# Patient Record
Sex: MALE | Race: WHITE | NOT HISPANIC OR LATINO
[De-identification: names, ages, dates, MRNs, and addresses within clinical notes are randomized per-mention and may not be internally consistent; named-entity substitution may affect disease eponyms.]

---

## 2021-09-20 PROBLEM — Z00.00 ENCOUNTER FOR PREVENTIVE HEALTH EXAMINATION: Status: ACTIVE | Noted: 2021-09-20

## 2021-09-23 ENCOUNTER — APPOINTMENT (OUTPATIENT)
Dept: OTOLARYNGOLOGY | Facility: CLINIC | Age: 86
End: 2021-09-23
Payer: MEDICARE

## 2021-09-23 ENCOUNTER — TRANSCRIPTION ENCOUNTER (OUTPATIENT)
Age: 86
End: 2021-09-23

## 2021-09-23 PROCEDURE — 31579 LARYNGOSCOPY TELESCOPIC: CPT

## 2021-09-23 PROCEDURE — 92524 BEHAVRAL QUALIT ANALYS VOICE: CPT | Mod: GN

## 2021-09-29 ENCOUNTER — APPOINTMENT (OUTPATIENT)
Dept: OTOLARYNGOLOGY | Facility: CLINIC | Age: 86
End: 2021-09-29
Payer: MEDICARE

## 2021-09-29 PROCEDURE — 92507 TX SP LANG VOICE COMM INDIV: CPT | Mod: GN

## 2021-10-06 ENCOUNTER — APPOINTMENT (OUTPATIENT)
Dept: OTOLARYNGOLOGY | Facility: CLINIC | Age: 86
End: 2021-10-06
Payer: MEDICARE

## 2021-10-06 PROCEDURE — 92507 TX SP LANG VOICE COMM INDIV: CPT | Mod: GN

## 2021-10-13 ENCOUNTER — APPOINTMENT (OUTPATIENT)
Dept: OTOLARYNGOLOGY | Facility: CLINIC | Age: 86
End: 2021-10-13
Payer: MEDICARE

## 2021-10-13 PROCEDURE — 92507 TX SP LANG VOICE COMM INDIV: CPT | Mod: GN

## 2021-10-20 ENCOUNTER — APPOINTMENT (OUTPATIENT)
Dept: OTOLARYNGOLOGY | Facility: CLINIC | Age: 86
End: 2021-10-20
Payer: MEDICARE

## 2021-10-20 PROCEDURE — 92507 TX SP LANG VOICE COMM INDIV: CPT | Mod: GN

## 2021-10-27 ENCOUNTER — APPOINTMENT (OUTPATIENT)
Dept: OTOLARYNGOLOGY | Facility: CLINIC | Age: 86
End: 2021-10-27
Payer: MEDICARE

## 2021-10-27 PROCEDURE — 92507 TX SP LANG VOICE COMM INDIV: CPT | Mod: GN

## 2021-11-03 ENCOUNTER — APPOINTMENT (OUTPATIENT)
Dept: OTOLARYNGOLOGY | Facility: CLINIC | Age: 86
End: 2021-11-03
Payer: MEDICARE

## 2021-11-03 PROCEDURE — 92507 TX SP LANG VOICE COMM INDIV: CPT | Mod: GN

## 2023-07-14 ENCOUNTER — EMERGENCY (EMERGENCY)
Facility: HOSPITAL | Age: 88
LOS: 1 days | Discharge: HOME | End: 2023-07-14
Admitting: EMERGENCY MEDICINE
Payer: MEDICARE

## 2023-07-14 VITALS
RESPIRATION RATE: 18 BRPM | TEMPERATURE: 98 F | SYSTOLIC BLOOD PRESSURE: 136 MMHG | OXYGEN SATURATION: 97 % | DIASTOLIC BLOOD PRESSURE: 79 MMHG | HEART RATE: 80 BPM

## 2023-07-14 VITALS
OXYGEN SATURATION: 95 % | DIASTOLIC BLOOD PRESSURE: 71 MMHG | RESPIRATION RATE: 17 BRPM | HEART RATE: 69 BPM | TEMPERATURE: 98 F | SYSTOLIC BLOOD PRESSURE: 142 MMHG | WEIGHT: 173.94 LBS

## 2023-07-14 DIAGNOSIS — M25.421 EFFUSION, RIGHT ELBOW: ICD-10-CM

## 2023-07-14 DIAGNOSIS — M25.521 PAIN IN RIGHT ELBOW: ICD-10-CM

## 2023-07-14 DIAGNOSIS — E78.5 HYPERLIPIDEMIA, UNSPECIFIED: ICD-10-CM

## 2023-07-14 DIAGNOSIS — I48.91 UNSPECIFIED ATRIAL FIBRILLATION: ICD-10-CM

## 2023-07-14 DIAGNOSIS — I10 ESSENTIAL (PRIMARY) HYPERTENSION: ICD-10-CM

## 2023-07-14 DIAGNOSIS — Z79.01 LONG TERM (CURRENT) USE OF ANTICOAGULANTS: ICD-10-CM

## 2023-07-14 PROCEDURE — 73080 X-RAY EXAM OF ELBOW: CPT

## 2023-07-14 PROCEDURE — 99284 EMERGENCY DEPT VISIT MOD MDM: CPT

## 2023-07-14 PROCEDURE — 73080 X-RAY EXAM OF ELBOW: CPT | Mod: 26,RT

## 2023-07-14 PROCEDURE — 99283 EMERGENCY DEPT VISIT LOW MDM: CPT | Mod: 25

## 2023-07-14 RX ORDER — CEPHALEXIN 500 MG
1 CAPSULE ORAL
Qty: 40 | Refills: 0
Start: 2023-07-14 | End: 2023-07-23

## 2023-07-14 NOTE — ED PROVIDER NOTE - OBJECTIVE STATEMENT
88 y/o m hx afib on eliquis presents c/o right elbow pain and mild swelling for the past few days.  Pt stating the area has felt warm to him, has no trouble ranging it, hadn't need to take anything for pain.  Denies fever, chills, numbness/tingling to ext, fall/trauma, all other ROS negative.

## 2023-07-14 NOTE — ED PROVIDER NOTE - CLINICAL SUMMARY MEDICAL DECISION MAKING FREE TEXT BOX
86 y/o m presents c/o right elbow pain, swelling to posterior aspect x several days; ext NVI, xr negative, has swelling posteriorly which is likely bursitis.  Pt with mild overlying erythema, will rx keflex, not concerned for septic arthritis.  Will ace wrap, tylenol, keflex, f/u ortho, return to ED if sx worsen.

## 2023-07-14 NOTE — ED PROVIDER NOTE - PROVIDER TOKENS
PROVIDER:[TOKEN:[[id number]]] PROVIDER:[TOKEN:[[id number]]] PROVIDER:[TOKEN:[[id number]]]

## 2023-07-14 NOTE — ED PROVIDER NOTE - NSFOLLOWUPINSTRUCTIONS_ED_ALL_ED_FT
Elbow Bursitis  Back view of the elbow with a close-up of a bursa sac.  Elbow bursitis is the swelling of the fluid-filled sac (bursa) at the tip of the elbow. A bursa is like a cushion that protects the joint. If the bursa becomes irritated, it can fill with extra fluid and become swollen.    What are the causes?  Injury to the elbow.  Leaning the elbow on a hard surface for a long time.  Infection.  Bone spurs.  Certain conditions that cause swelling.  Sometimes, the cause is not known.    What are the signs or symptoms?  The first sign of this condition is often swelling at the tip of the elbow. The swelling can grow to the size of a golf ball. Other symptoms include:  Pain when bending or leaning on the elbow.  Stiffness of the elbow.  If the cause is infection, you may have:  Redness, warmth, and tenderness.  Pus coming from a cut near the elbow.  How is this treated?  Treatment depends on the cause. It may include:  Medicines.  Draining fluid from the bursa.  Placing a bandage or pressure (compression) sleeve around the elbow.  Wearing elbow pads.  Surgery, if other treatments do not help.  Follow these instructions at home:  Medicines    Take over-the-counter and prescription medicines only as told by your doctor.  If you were prescribed an antibiotic medicine, take it as told by your doctor. Do not stop taking it even if you start to feel better.  Managing pain, stiffness, and swelling    Bag of ice on a towel on the skin.  A heating pad being used on the affected joint.  If told, put ice on the elbow. To do this:  Put ice in a plastic bag.  Place a towel between your skin and the bag.  Leave the ice on for 20 minutes, 2–3 times a day.  Take off the ice if your skin turns bright red. This is very important. If you cannot feel pain, heat, or cold, you have a greater risk of damage to the area.  If told, put heat on the affected area. Do this as often as told by your doctor. Use the heat source that your doctor recommends, such as a moist heat pack or a heating pad.  Place a towel between your skin and the heat source.  Leave the heat on for 20–30 minutes.  Take off the heat if your skin turns bright red. This is very important. If you cannot feel pain, heat, or cold, you have a greater risk of getting burned.  If your bursitis is caused by an injury, follow instructions from your doctor about:  Resting your elbow.  Wearing a bandage or sleeve.  Wear elbow pads or elbow wraps as needed. These help cushion your elbow.  General instructions    Avoid any activities that cause elbow pain. Ask your doctor what activities are safe for you.  Keep all follow-up visits.  Contact a doctor if:  You have a fever.  You have problems that do not get better with treatment.  You have pain or swelling that:  Gets worse.  Goes away and then comes back.  You have pus draining from your elbow.  You have redness around the elbow area.  Your elbow feels warm to the touch.  Get help right away if:  You have trouble moving your arm, hand, or fingers.  Summary  Elbow bursitis is the swelling of the fluid-filled sac (bursa) at the tip of the elbow.  You may need to take medicine or put ice on your elbow.  Contact your doctor if your problems do not get better with treatment. Also, contact your doctor if your problems go away and then come back.  This information is not intended to replace advice given to you by your health care provider. Make sure you discuss any questions you have with your health care provider.

## 2023-07-14 NOTE — ED PROVIDER NOTE - PATIENT PORTAL LINK FT
You can access the FollowMyHealth Patient Portal offered by [facility] by registering at the following website: [email or web address]. By joining [employer]’s FollowMyHealth portal, you will also be able to view your health information using other applications (apps) compatible with our system. You can access the FollowMyHealth Patient Portal offered by [facility] by registering at the following website: [email or web address]. By joining [employer]’s FollowMyHealth portal, you will also be able to view your health information using other applications (apps) compatible with our system. You can access the FollowMyHealth Patient Portal offered by [facility] by registering at the following website: [email or web address]. By joining [email or web address]’s FollowMyHealth portal, you will also be able to view your health information using other applications (apps) compatible with our system.

## 2023-07-14 NOTE — ED ADULT NURSE NOTE - OBJECTIVE STATEMENT
Pt is 87 y.o male pt came in c/o worsening R elbow pain and swelling for 2 weeks. Pt A&Ox4. Pt is conversive and has a steady gait. Denies trauma, injury, recent falls. Pt has hx of htn and hdl. Pt reports pain is when he touches it or with movement. Denies fever, chills, numbness or tingling. Assessment ongoing. Will cont to monitor.

## 2023-07-14 NOTE — ED PROVIDER NOTE - MUSCULOSKELETAL, MLM
right elbow +superficial swelling to posterior olecranon, no bony tenderness, +warmth and mild erythema, mild TTP, +FROM, extension and flexion intact

## 2023-07-14 NOTE — ED PROVIDER NOTE - CARE PROVIDER_API CALL
Lobo Chopra  Orthopaedic Surgery  [address] [place] [address], [address]  [place] [address]  Phone: [phone]  Fax: [phone]  Follow Up Time:    Lobo Chopra  Orthopaedic Surgery  [address] [place] [address], [address]  [place] [address]  Phone: [phone]  Fax: [phone]  Follow Up Time:    Lobo Chopra  Orthopaedic Surgery  [address] [place] [address], [address]  [place] [address]  Phone: [phone]  Fax: [phone]  Follow Up Time:

## 2023-07-14 NOTE — ED ADULT TRIAGE NOTE - CHIEF COMPLAINT QUOTE
Pt c/o R elbow pain x 1 week. Pt denies trauma, numbness/tingling, radiating pain. PMH HTN, HLD, GERD, A fib.

## 2023-07-14 NOTE — ED ADULT NURSE NOTE - NSFALLUNIVINTERV_ED_ALL_ED
Bed/Stretcher in lowest position, wheels locked, appropriate side rails in place/Call bell, personal items and telephone in reach/Instruct patient to call for assistance before getting out of bed/chair/stretcher/Non-slip footwear applied when patient is off stretcher/[place] to call system/Physically safe environment - no spills, clutter or unnecessary equipment/Purposeful proactive rounding/Room/bathroom lighting operational, light cord in reach Bed/Stretcher in lowest position, wheels locked, appropriate side rails in place/Call bell, personal items and telephone in reach/Instruct patient to call for assistance before getting out of bed/chair/stretcher/Non-slip footwear applied when patient is off stretcher/[place] to call system/Physically safe environment - no spills, clutter or unnecessary equipment/Purposeful proactive rounding/Room/bathroom lighting operational, light cord in reach Bed/Stretcher in lowest position, wheels locked, appropriate side rails in place/Call bell, personal items and telephone in reach/Instruct patient to call for assistance before getting out of bed/chair/stretcher/Non-slip footwear applied when patient is off stretcher/[place] to call system/Physically safe environment - no spills, clutter or unnecessary equipment/Purposeful proactive rounding/Room/bathroom lighting operational, light cord in reach